# Patient Record
Sex: MALE | URBAN - METROPOLITAN AREA
[De-identification: names, ages, dates, MRNs, and addresses within clinical notes are randomized per-mention and may not be internally consistent; named-entity substitution may affect disease eponyms.]

---

## 2021-11-10 ENCOUNTER — HISTORICAL (OUTPATIENT)
Dept: ADMINISTRATIVE | Facility: HOSPITAL | Age: 36
End: 2021-11-10

## 2021-12-08 ENCOUNTER — HISTORICAL (OUTPATIENT)
Dept: ADMINISTRATIVE | Facility: HOSPITAL | Age: 36
End: 2021-12-08

## 2021-12-13 ENCOUNTER — HISTORICAL (OUTPATIENT)
Dept: ADMINISTRATIVE | Facility: HOSPITAL | Age: 36
End: 2021-12-13

## 2021-12-14 ENCOUNTER — HISTORICAL (OUTPATIENT)
Dept: SURGERY | Facility: HOSPITAL | Age: 36
End: 2021-12-14

## 2021-12-14 LAB
BUN SERPL-MCNC: 12.1 MG/DL (ref 8.9–20.6)
CALCIUM SERPL-MCNC: 9.8 MG/DL (ref 8.7–10.5)
CHLORIDE SERPL-SCNC: 106 MMOL/L (ref 98–107)
CO2 SERPL-SCNC: 27 MMOL/L (ref 22–29)
CREAT SERPL-MCNC: 1.1 MG/DL (ref 0.73–1.18)
CREAT/UREA NIT SERPL: 11
EST CREAT CLEARANCE SER (OHS): 80.57 ML/MIN
GLUCOSE SERPL-MCNC: 102 MG/DL (ref 74–100)
POTASSIUM SERPL-SCNC: 3.9 MMOL/L (ref 3.5–5.1)
SODIUM SERPL-SCNC: 140 MMOL/L (ref 136–145)

## 2022-01-03 ENCOUNTER — HISTORICAL (OUTPATIENT)
Dept: ADMINISTRATIVE | Facility: HOSPITAL | Age: 37
End: 2022-01-03

## 2022-01-31 ENCOUNTER — HISTORICAL (OUTPATIENT)
Dept: ADMINISTRATIVE | Facility: HOSPITAL | Age: 37
End: 2022-01-31

## 2022-04-10 ENCOUNTER — HISTORICAL (OUTPATIENT)
Dept: ADMINISTRATIVE | Facility: HOSPITAL | Age: 37
End: 2022-04-10

## 2022-04-27 VITALS
HEIGHT: 65 IN | SYSTOLIC BLOOD PRESSURE: 148 MMHG | WEIGHT: 138.44 LBS | OXYGEN SATURATION: 99 % | DIASTOLIC BLOOD PRESSURE: 83 MMHG | BODY MASS INDEX: 23.07 KG/M2

## 2022-04-30 NOTE — H&P
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 He was last seen in clinic a month ago and the plan was to treat this nonoperatively. He is a . Unfortunately, he can still feel the bony fragments moving around in his shoulder and has been unable to return to work as a result of the pain and functional limitations this causes. Here to discuss operative versus nonoperative treatment.   Review of Systems Constitutional: no fever, fatigue, weakness  Eye: no vision loss, eye redness, drainage, or pain  ENMT: no sore throat, ear pain, sinus pain/congestion, nasal congestion/drainage  Respiratory: no cough, no wheezing, no shortness of breath  Cardiovascular: no chest pain, no palpitations, no edema  Gastrointestinal: no nausea, vomiting, or diarrhea. No abdominal pain    Physical Exam   Vitals & Measurements HT: 152.00 cm WT: 61.000 kg BMI: 26.4 Gen - NAD  CV - RR by palpable pulse  Resp - nonlabored breathing    Left upper extremity  No abrasions, lacerations, open wounds. There is some minimal swelling about the left clavicle. He has some tenderness over the left clavicle fracture. He has a significant amount of motion at the fracture site I can move the fracture fragments back and forth. Motor intact AIN/PIN/ulnar. Sensation intact median/radial/ulnar. 2+ radial pulse.   Assessment/Plan Fracture of left clavicle S42.002A   Ordered:   Clinic Follow-up PRN, 12/10/21 7:41:00 CST   Risk benefits of operative versus nonoperative treatment were discussed with the patient. Discussed that her options are we can wait another  6 weeks and see if this begins to heal or we can proceed with surgery. My concern is that if we wait 6 weeks, he will continue to progress in the direction of nonunion we will end up booking him for surgery anyways. 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 Patient elected to proceed with surgery. Consent was obtained. Plan for ORIF L clavicle on 12/14/21 .    ATTENDING ADDENDUM    Nery Palma Reyes was evaluated at the time of the encounter with Dr Jasso. HPI, PE and treatment plan was reviewed. Treatment plan was reasonable and necessary. Imaging was reviewed at the time of visit.     The risks, benefits, outcomes, and alternatives of conservative vs operative management were discussed with the patient in clinic today. Informed consent was obtained for ORIF left clavicle   ATTENDING ADDENDUM     No interval change in patient condition    Nery Palma Reyes was evaluated on the day of surgery.  HPI, PE and treatment plan was reviewed.  Treatment plan was reasonable and necessary.

## 2022-05-03 NOTE — HISTORICAL [facility] CERNER
This is a historical note converted from [employer]. Formatting and pictures may have been removed.  Please reference [employer] for original formatting and attached multimedia. Chief Complaint  followup for left clavicular fracture  History of Present Illness  This is a 36-year-old male right-hand-dominant Faroese speaker?who is approximately 6 weeks out from sustaining a left clavicle fracture. ?He was last seen in clinic a month ago and the plan was to treat this nonoperatively.? He is a .? Unfortunately, he can still feel the bony fragments moving around in his shoulder and has been unable to return to work as a result?of the pain and functional limitations this causes.  Review of Systems  Constitutional:?no fever, fatigue, weakness  Eye:?no vision loss, eye redness, drainage, or pain  ENMT:?no sore throat, ear pain, sinus pain/congestion, nasal congestion/drainage  Respiratory:?no cough, no wheezing, no shortness of breath  Cardiovascular:?no chest pain, no palpitations, no edema  Gastrointestinal:?no nausea, vomiting, or diarrhea. No abdominal pain  ?  ?  Physical Exam  Vitals & Measurements  HT:?152.00?cm? WT:?61.900?kg? BMI:?26.79?  Gen - NAD  CV - RR by palpable pulse  Resp - nonlabored breathing  ?  Left upper extremity  No abrasions, lacerations, open wounds. ?There is some minimal swelling about the left clavicle. ?He has some tenderness?over the left clavicle fracture.? He has a significant amount of motion at the fracture site I can move the fracture fragments back and forth. ?Motor intact?AIN/PIN/ulnar. ?Sensation intact median/radial/ulnar. ?2+ radial pulse.  ?  Imaging  Left clavicle xrays obtained today show displaced distal clavicular shaft fracture with no evidence of?interval healing.? No callus formation visualized.  Assessment/Plan  1.?Fracture of left clavicle?S42.002A  Ordered:  XR Clavicle Left, Routine, 12/08/21 12:16:00 CST, None, Ambulatory, Rad Type, Fracture of left  clavicle, Not Scheduled, 12/08/21 12:16:00 CST  ?  Risk benefits of operative versus nonoperative treatment were discussed with the patient. ?Discussed that her options are we can?wait another 6 weeks and see if this begins to heal or we can proceed with surgery. ?My concern is that if we wait 6 weeks, he will?continue to?progress in the direction of nonunion we will end up booking him for surgery anyways. 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 He wants to think about surgery. ?We will have him follow-up in 2 days, after further discussion with his friends and family,?for reevaluation. ?At that time, he will let us know if he wants to proceed with surgery versus continue with nonoperative treatment.  ?  ATTENDING ADDENDUM  ?  Nery Palma Reyes?was evaluated at the time of the encounter with?Dr Jasso.? HPI, PE and treatment plan was reviewed.? Treatment plan was reasonable and necessary.??Imaging was reviewed at the time of visit.??   Medications  acetaminophen-hydrocodone 325 mg-7.5 mg oral tablet, 1 tab(s), Oral, q6hr,? ?Not taking  indomethacin 50 mg oral capsule, 50 mg= 1 cap(s), Oral, TID, PRN,? ?Not taking

## 2022-05-03 NOTE — HISTORICAL [facility] CERNER
This is a historical note converted from [employer]. Formatting and pictures may have been removed.  Please reference [employer] for original formatting and attached multimedia. DATE OF SURGERY:?12/15/21  ?  PREOPERATIVE DIAGNOSES:  1.?left?clavicle fracture  ?   POSTOPERATIVE DIAGNOSES:  ?   Same  ?   PROCEDURE: Open reduction and internal fixation of clavicle fracture  ?  ATTENDING PHYSICIAN:?Isai Sanchez MD  ?  RESIDENT PHYSICIANS: Dr Komal Jasso PGY5, Dr Dennis Bello PGY3  ?  ANESTHESIA:? General  ?  EBL: 20cc  ?  SPECIMEN: none  ?  IMPLANTS: [employer] VariAx clavicle platting system  ?  COMPLICATIONS: none  ?  DISPOSITION: To recovery room in stable condition  ?  PREOPERATIVE NOTE:??Nery Palma Reyes?is a?36 Years?Male?who presented to our clinic for follow up assessment and definitive management of their?clavicle fracture.? Unfortunately the patient suffered this fracture more than 6 weeks ago and has no?clinical or?radiographic evidence of healing.? While in clinic the risks, benefits, outcomes, and alternatives of conservative versus operative management were discussed with the patient. ?Informed consent was obtained for an open reduction and?internal fixation of their clavicle fracture.  ?  OPERATIVE NOTE: ?The patient was brought into the operating room and positioned supine on the OR table. ?A general anesthetic was administered. ?The patient was then placed in a semi-seated position. ?The?clavicle was prepped and draped in the usual fashion. ?A preoperative pause was performed to confirm the site and side for surgery. ?Preoperative antibiotics were given.?  ?  An incision was made over the superior aspect of the clavicle, centered over the fracture site. ?This was carried down through subcutaneous tissue and fat. ?A flap was elevated above the deltopectoral fascia. ?The deltopectoral fascia was then incised over the fracture site and elevated as a separate flap. ?The fracture was exposed and thoroughly  irrigated. ?Using combination of rongeurs and small curettes, hematoma and interval callus were removed. ?The fracture was then anatomically reduced and held in place with a combination of pointed and lobster-claw reduction forceps.?  ?  Using a [employer] VariAx clavicle plate, the fracture was stabilized using 3.5mm cortical and locking screws?screws. ?Intraoperative fluoroscopy confirmed anatomic reduction of the fracture as well as appropriate position of the hardware.?  ?  Once we were satisfied with the reduction and position of the hardware, the wound was thoroughly irrigated. ?The clavipectoral fascia was closed with 2-0 Vicryl sutures. ?The subcutaneous tissue was closed 2-0 Vicryl sutures. ?The skin was closed with a 3-0 monocryl suture.?A Sterile dressing was applied to the?surgical site.  ?  General anesthetic was reversed, and the patient was transferred to the hospital stretcher.? They were?brought to the recovery room in stable condition. ?There were no intraoperative complications. ?Estimated blood loss was 20 cc.  ?  I was present for all critical steps of the procedure  ?  POSTOPERATIVE NOTE: ?The patient will?continue to use the?sling until we see them?in clinic in 2 weeks. I have educated the patient on pendulum range-of-motion exercises for the shoulder?as well as elbow, wrist, and hand exercises that I would like them to perform daily?between now their followup appointment.? Nonweightbearing for 6 weeks from the time?of surgery.  ?  ?  ?  ?  ?

## 2022-05-03 NOTE — HISTORICAL [facility] CERNER
This is a historical note converted from [employer]. Formatting and pictures may have been removed.  Please reference [employer] for original formatting and attached multimedia. 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 ?He denies any numbness, tingling, or motor loss to the left upper extremity.? Pain is well controlled.? Is healing well without complications.? No constitutional symptoms.?No other complaints or concerns.?  Review of Systems  Constitutional:?no fever, fatigue, weakness  Eye:?no vision loss, eye redness, drainage, or pain  ENMT:?no sore throat, ear pain, sinus pain/congestion, nasal congestion/drainage  Respiratory:?no cough, no wheezing, no shortness of breath  Cardiovascular:?no chest pain, no palpitations, no edema  Gastrointestinal:?no nausea, vomiting, or diarrhea. No abdominal pain  ?  ?  ?  Physical Exam  Vitals & Measurements  HT:?164.30?cm? WT:?62.800?kg? BMI:?23.26?  Shoulder exam?Left?  ??  Inspection?  Skin:?incision over clavicle healing well with steri strips  Warmth:?no?  Erythema:?no?  ?  Palpation?  Tenderness:?mildly over?the clavicle?incision  Crepitation:?none?  ?  ROM  shoulder IR 40, External rotation 40,?abduction?to 90  ???  Neurovascular?  BP:?2+?  RP:?2+?  Sensation?intact Ax/Radial/Ulnar/Median/MSC  Motor intact abduction/IR/ER/ADD shoulder  motor intact M/U/R/AIN/PIN  Assessment/Plan  1.?Fracture of left clavicle?S42.002A,?Fracture of left clavicle?S42.002A  36-year-old male right-hand-dominant Sinhala speaker?2 weeks out from ORIF left clavicle after initial attempt at nonoperative management however there was no radiographic evidence of healing so ORIF was recommended. He has been doing well overall and has good ROM of the shoulder  with minimal pain. Incision is healing well.  ?   1. He can discontinue the sling at this point and continue doing the ROM exercises of the shoulder that were demonstrated to him today and postoperatively  2. Continue taking pain medicaitons as needed  3. Advance to WBAT over next 4 weeks.  4. He will follow up in 4 weeks  ?   ATTENDING ADDENDUM  ?   Nery Palma Reyes?was evaluated at the time of the encounter with?Dr Odom.? HPI, PE and treatment plan was reviewed.? Treatment plan was reasonable and necessary.??Imaging was reviewed at the time of visit.??   Medications  acetaminophen-hydrocodone 325 mg-5 mg oral tablet, 1 tab(s), Oral, q6hr  acetaminophen-hydrocodone 325 mg-7.5 mg oral tablet, 1 tab(s), Oral, q6hr,? ?Not taking  indomethacin 50 mg oral capsule, 50 mg= 1 cap(s), Oral, TID, PRN,? ?Not taking  methocarbamol 750 mg oral tablet, 750 mg= 1 tab(s), Oral, TID

## 2022-05-03 NOTE — HISTORICAL [facility] C[facility]
This is a historical note converted from [employer]. Formatting and pictures may have been removed.  Please reference [employer] for original formatting and attached multimedia. Chief Complaint  Referral for left clavicular fracture  History of Present Illness  Patient is a 36-year-old male right-hand-dominant Sinhala speaker presents to clinic for evaluation for left clavicle fracture. ?Patient states that he was playing soccer 8 days ago when he?sustained this injury from a direct blow to his shoulder. ?Endorses pain over the left clavicle. ?Denies any numbness or tingling in same. ?Denies any additional trauma or injury. ?Is a .  Review of Systems  Constitutional:?no fever, fatigue, weakness  Eye:?no vision loss, eye redness, drainage, or pain  ENMT:?no sore throat, ear pain, sinus pain/congestion, nasal congestion/drainage  Respiratory:?no cough, no wheezing, no shortness of breath  Cardiovascular:?no chest pain, no palpitations, no edema  Gastrointestinal:?no nausea, vomiting, or diarrhea. No abdominal pain  ?  ?  Physical Exam  Vitals & Measurements  T:?37? ?C (Oral)? HR:?91(Peripheral)? RR:?20? BP:?148/83? SpO2:?99%?  HT:?182.00?cm? WT:?59.900?kg? BMI:?18.08?  Left upper extremity:  No open wounds, abrasions lacerations  Ecchymosis noted over the left clavicle  Crepitus tenderness to palpation of the left clavicle  Motor intact median/ulnar/radial/anterior/posterior no distribution  Sensation intact median/ulnar/radial nerve distribution  Pulses 2+  No significant?shortening is noted as compared to contralateral side  Assessment/Plan  Clavicle fracture?S42.009A  ?Patient is a 36-year-old male Sinhala-speaking right-hand-dominant  who presents to clinic for evaluation for a left clavicle fracture.? After discussion with the patient regarding the risk, benefits and alternatives of surgical versus nonoperative management he elected to proceed with nonoperative management.? We will  provide him with a sling  Follow-up in?4 to 5 weeks  Instructed?the patient start working range of motion of the left shoulder when he is able to.  ?  ATTENDING ADDENDUM  ?  Nery Palma Reyes?was evaluated at the time of the encounter with?Dr Bello.? HPI, PE and treatment plan was reviewed.? Treatment plan was reasonable and necessary.??Imaging was reviewed at the time of visit.??  ?   Medications  acetaminophen-hydrocodone 325 mg-7.5 mg oral tablet, 1 tab(s), Oral, q6hr  indomethacin 50 mg oral capsule, 50 mg= 1 cap(s), Oral, TID, PRN  Diagnostic Results  On independent review of left clavicle radiographs patient has a?displaced left clavicle fracture?with?mild shortening